# Patient Record
Sex: FEMALE | Race: WHITE | NOT HISPANIC OR LATINO | URBAN - METROPOLITAN AREA
[De-identification: names, ages, dates, MRNs, and addresses within clinical notes are randomized per-mention and may not be internally consistent; named-entity substitution may affect disease eponyms.]

---

## 2025-07-24 ENCOUNTER — OFFICE VISIT (OUTPATIENT)
Dept: URGENT CARE | Age: 40
End: 2025-07-24
Payer: COMMERCIAL

## 2025-07-24 VITALS
SYSTOLIC BLOOD PRESSURE: 123 MMHG | HEART RATE: 98 BPM | TEMPERATURE: 98 F | HEIGHT: 64 IN | BODY MASS INDEX: 50.02 KG/M2 | OXYGEN SATURATION: 97 % | DIASTOLIC BLOOD PRESSURE: 82 MMHG | RESPIRATION RATE: 18 BRPM | WEIGHT: 293 LBS

## 2025-07-24 DIAGNOSIS — N76.0 ACUTE VAGINITIS: Primary | ICD-10-CM

## 2025-07-24 DIAGNOSIS — R10.2 VAGINAL PAIN: ICD-10-CM

## 2025-07-24 LAB
POC APPEARANCE, URINE: CLEAR
POC BILIRUBIN, URINE: NEGATIVE
POC BLOOD, URINE: NEGATIVE
POC COLOR, URINE: YELLOW
POC GLUCOSE, URINE: ABNORMAL MG/DL
POC KETONES, URINE: NEGATIVE MG/DL
POC LEUKOCYTES, URINE: NEGATIVE
POC NITRITE,URINE: NEGATIVE
POC PH, URINE: 6 PH
POC PROTEIN, URINE: NEGATIVE MG/DL
POC SPECIFIC GRAVITY, URINE: 1.01
POC UROBILINOGEN, URINE: 0.2 EU/DL
PREGNANCY TEST URINE, POC: NEGATIVE

## 2025-07-24 RX ORDER — ONDANSETRON 4 MG/1
4 TABLET, ORALLY DISINTEGRATING ORAL 3 TIMES DAILY PRN
COMMUNITY
Start: 2024-04-04

## 2025-07-24 RX ORDER — EMPAGLIFLOZIN 10 MG/1
10 TABLET, FILM COATED ORAL DAILY
COMMUNITY

## 2025-07-24 RX ORDER — ATORVASTATIN CALCIUM 20 MG/1
20 TABLET, FILM COATED ORAL DAILY
COMMUNITY

## 2025-07-24 RX ORDER — BLOOD-GLUCOSE SENSOR
EACH MISCELLANEOUS
COMMUNITY
Start: 2025-05-27

## 2025-07-24 RX ORDER — NIFEDIPINE 30 MG/1
30 TABLET, EXTENDED RELEASE ORAL DAILY
COMMUNITY

## 2025-07-24 RX ORDER — GLIPIZIDE 5 MG/1
5 TABLET, FILM COATED, EXTENDED RELEASE ORAL
COMMUNITY
Start: 2025-05-10

## 2025-07-24 RX ORDER — FLUCONAZOLE 150 MG/1
TABLET ORAL
Qty: 2 TABLET | Refills: 0 | Status: SHIPPED | OUTPATIENT
Start: 2025-07-24

## 2025-07-24 RX ORDER — LISINOPRIL AND HYDROCHLOROTHIAZIDE 20; 25 MG/1; MG/1
1 TABLET ORAL DAILY
COMMUNITY

## 2025-07-24 NOTE — PROGRESS NOTES
"Subjective   Patient ID: Suri Wellington is a 40 y.o. female. They present today with a chief complaint of Vaginal Pain (Pt c/o pain and swelling x 4 days).    History of Present Illness    Vaginal Pain    40-year-old patient presents to clinic with complaints of vaginal irritation with associated erythema, edema, pruritus ongoing for the past 4 days.  Reports is sexually active with 1 partner.   Reports is taking Jardiance. Denies fevers, chills, nausea, vomiting, flank pain, hematuria, vaginal discharge.      Past Medical History  Allergies as of 07/24/2025    (No Known Allergies)       Prescriptions Prior to Admission[1]     Medical History[2]    Surgical History[3]     reports that she has never smoked. She has never used smokeless tobacco.    Review of Systems  ROS negative with the exception as noted on HPI                                Objective    Vitals:    07/24/25 1642 07/24/25 1723   BP: 123/82    Pulse: 110 98   Resp: 18    Temp: 36.7 °C (98 °F)    SpO2: 97%    Weight: 135 kg (297 lb)    Height: 1.626 m (5' 4\")      No LMP recorded.    Physical Exam  Exam conducted with a chaperone present.   Constitutional:       General: She is not in acute distress.     Appearance: Normal appearance.   HENT:      Head: Normocephalic and atraumatic.     Cardiovascular:      Rate and Rhythm: Normal rate and regular rhythm.      Heart sounds: Normal heart sounds. No murmur heard.  Pulmonary:      Effort: Pulmonary effort is normal. No respiratory distress.      Breath sounds: No stridor. No wheezing, rhonchi or rales.   Abdominal:      General: Bowel sounds are normal. There is no distension.      Palpations: Abdomen is soft.      Tenderness: There is abdominal tenderness (pressure suprapubic). There is no right CVA tenderness, left CVA tenderness or guarding.   Genitourinary:     Pubic Area: No rash.       Labia:         Right: Rash (flaking rash with erythema, edema, and white thick discharge) present. No tenderness.    "      Left: Rash (flaking rash with erythema, edema, and white thick discharge) present. No tenderness.       Vagina: No signs of injury. Vaginal discharge (thick; white) and erythema present.      Cervix: Normal.      Comments: Vulva with erythema and edema  Lymphadenopathy:      Lower Body: No right inguinal adenopathy. No left inguinal adenopathy.     Neurological:      Mental Status: She is alert.         Procedures    Point of Care Test & Imaging Results from this visit  Results for orders placed or performed in visit on 07/24/25   POCT UA Automated manually resulted   Result Value Ref Range    POC Color, Urine Yellow Straw, Yellow, Light-Yellow    POC Appearance, Urine Clear Clear    POC Glucose, Urine >=1000 (4+) (A) NEGATIVE mg/dl    POC Bilirubin, Urine NEGATIVE NEGATIVE    POC Ketones, Urine NEGATIVE NEGATIVE mg/dl    POC Specific Gravity, Urine 1.015 1.005 - 1.035    POC Blood, Urine NEGATIVE NEGATIVE    POC PH, Urine 6.0 No Reference Range Established PH    POC Protein, Urine NEGATIVE NEGATIVE mg/dl    POC Urobilinogen, Urine 0.2 0.2, 1.0 EU/DL    Poc Nitrite, Urine NEGATIVE NEGATIVE    POC Leukocytes, Urine NEGATIVE NEGATIVE   POCT pregnancy, urine manually resulted   Result Value Ref Range    Preg Test, Ur Negative Negative      Imaging  No results found.    Cardiology, Vascular, and Other Imaging  No other imaging results found for the past 2 days      Diagnostic study results (if any) were reviewed by Lucy Hardin PA-C.    Assessment/Plan   Allergies, medications, history, and pertinent labs/EKGs/Imaging reviewed by Lucy Hardin PA-C.   vaginal irritation with associated erythema, edema, pruritus ongoing for the past 4 days.   GC/chlamydia, trichomonas collected and urine sent to lab.   BV/yeast swab collected and sent to lab.  On exam consistent with vaginal candidal infection. Diflucan started for suspected candidal vaginal infection. Pt. Is advised to avoid sexual activity at this  time. Keep area clean and dry. Avoid douches or scented products. Avoid tight fitting clothing. Monitor for worsening vaginal symptoms, fevers, chills, new development of flank pain, visible blood in urine, dysuria, urinary frequency/urgency and if these occur pt. Is advised to RTC or proceed to the ED. Risk, benefits, and potential side effects of medication(s) discussed with pt. Discussed disease/illness presentation, treatment options, progression, complications, and outcomes with patient. Pt. Has expressed understanding and is an agreement of plan of care.    Medical Decision Making      Orders and Diagnoses  Diagnoses and all orders for this visit:  Acute vaginitis  -     C. trachomatis / N. gonorrhoeae, Amplified, Urogenital  -     Trichomonas vaginalis, Amplified; Future  -     Vaginitis Gram Stain For Bacterial Vaginosis + Yeast  -     fluconazole (Diflucan) 150 mg tablet; Take one tablet today and if symptoms have not resolved repeat in 3 days.  Vaginal pain  -     POCT UA Automated manually resulted  -     POCT pregnancy, urine manually resulted      Medical Admin Record      Patient disposition: Home    Electronically signed by Lucy Hardin PA-C  5:28 PM           [1] (Not in a hospital admission)   [2] No past medical history on file.  [3] No past surgical history on file.

## 2025-07-24 NOTE — PATIENT INSTRUCTIONS
Pt. Is advised to avoid sexual activity at this time. Keep area clean and dry. Avoid douches or scented products. Avoid tight fitting clothing. Monitor for worsening vaginal symptoms, fevers, chills, new development of flank pain, visible blood in urine, dysuria, urinary frequency/urgency and if these occur pt. Is advised to RTC or proceed to the ED.

## 2025-07-25 LAB
BV SCORE VAG QL: NORMAL
C TRACH RRNA SPEC QL NAA+PROBE: NOT DETECTED
N GONORRHOEA RRNA SPEC QL NAA+PROBE: NOT DETECTED
QUEST GC CT AMPLIFIED (ALWAYS MESSAGE): NORMAL
T VAGINALIS RRNA SPEC QL NAA+PROBE: NOT DETECTED